# Patient Record
Sex: FEMALE | Race: WHITE
[De-identification: names, ages, dates, MRNs, and addresses within clinical notes are randomized per-mention and may not be internally consistent; named-entity substitution may affect disease eponyms.]

---

## 2020-01-31 ENCOUNTER — HOSPITAL ENCOUNTER (OUTPATIENT)
Dept: HOSPITAL 25 - OR | Age: 60
Discharge: HOME | End: 2020-01-31
Attending: INTERNAL MEDICINE
Payer: COMMERCIAL

## 2020-01-31 VITALS — SYSTOLIC BLOOD PRESSURE: 130 MMHG | DIASTOLIC BLOOD PRESSURE: 92 MMHG

## 2020-01-31 DIAGNOSIS — C77.1: Primary | ICD-10-CM

## 2020-01-31 DIAGNOSIS — C34.90: ICD-10-CM

## 2020-01-31 DIAGNOSIS — Z87.891: ICD-10-CM

## 2020-01-31 PROCEDURE — 81445 SO NEO GSAP 5-50DNA/DNA&RNA: CPT

## 2020-01-31 PROCEDURE — 88305 TISSUE EXAM BY PATHOLOGIST: CPT

## 2020-01-31 PROCEDURE — 88172 CYTP DX EVAL FNA 1ST EA SITE: CPT

## 2020-01-31 PROCEDURE — 88173 CYTOPATH EVAL FNA REPORT: CPT

## 2020-01-31 PROCEDURE — 88341 IMHCHEM/IMCYTCHM EA ADD ANTB: CPT

## 2020-01-31 PROCEDURE — 88360 TUMOR IMMUNOHISTOCHEM/MANUAL: CPT

## 2020-01-31 PROCEDURE — 88342 IMHCHEM/IMCYTCHM 1ST ANTB: CPT

## 2020-01-31 NOTE — BRIEFOPN
Brief Operative/Procedure Note





- Operation Details


Pre-Op Diagnosis: Lung mass on left side


Post-Op Diagnosis: NSCLC metastatic to L4 node


Procedures: Bronchoscopy/EBUS with FNA of R4, station 7, L4


Surgeon(s)/Proceduralists: osmany Price


Anesthesia: [place]- Dr Alberto


Estimated Blood Loss: Negligable


Findings: No malignancy in R4 and station 7. Malignant cells in L4


Specimen(s)/Culture(s) Description: Cytology from R4, station 7, L4


Complications: NOne

## 2020-02-06 LAB — GENETICIST REVIEW: (no result)

## 2020-02-20 ENCOUNTER — HOSPITAL ENCOUNTER (OUTPATIENT)
Dept: HOSPITAL 25 - OR | Age: 60
Discharge: HOME | End: 2020-02-20
Attending: SURGERY
Payer: COMMERCIAL

## 2020-02-20 VITALS — DIASTOLIC BLOOD PRESSURE: 89 MMHG | SYSTOLIC BLOOD PRESSURE: 124 MMHG

## 2020-02-20 DIAGNOSIS — C34.12: Primary | ICD-10-CM

## 2020-02-20 DIAGNOSIS — Z87.891: ICD-10-CM

## 2020-02-20 PROCEDURE — C1788 PORT, INDWELLING, IMP: HCPCS

## 2020-02-20 PROCEDURE — 76000 FLUOROSCOPY <1 HR PHYS/QHP: CPT

## 2020-02-20 PROCEDURE — 71045 X-RAY EXAM CHEST 1 VIEW: CPT

## 2020-02-20 NOTE — OP
Operative Report - Blank





- Operative Report


Date of Operation: 02/20/20


Note: 





PRE-OP DX: Lung cancer


   


POST-OP DX: Same





PROCEDURE: PowerPort placement





SURGEON: Norma Nava MD





ANESTHESIA: MAC





FINDINGS: Successful placement of 8Fr PowerPort in right subclavian vein





INDICATION:


Zuly Hicks is a 59 year-old woman who was recently diagnosed with lung cancer 

found on screening CT scan. She was seen in clinic to discuss port placement 

for treatment. Risks were discussed including but not limited to bleeding, 

infection, pneumothorax, or arrhythmia. She agreed to proceed with surgery.





DESCRIPTION:


The patient was brought to the OR and placed in the supine position on the OR 

table.  SCDs were placed.  The patient was warmed.  She received cefazolin 2g.  

MAC anesthesia was administered.  The right neck and upper chest were prepped 

and draped in the usual sterile fashion.  A timeout confirming the patient's 

name, date of birth, and procedure was called.





Lidocaine 1% was injected into the skin over the right internal jugular vein.  

The vein was cannulated with a needle under ultrasound guidance.  I attempted 

to advance the wire but met resistance. With fluoroscopy, I could see the wire 

was coiling. I removed the wire and needle and cannulated the right internal 

jugular vein again. When I advanced the wire, there again was resistance with 

the wire coiling.  I then injected lidocaine below the right clavicle before 

cannulating the right subclavian vein. The wire was easily advanced.  

Fluoroscopy confirmed correct placement of the wire in the superior vena cava.  

Next, lidocaine 1% was injected into the skin at the planned port site.  A 

transverse incision was made with a scalpel and carried down to the fascia with 

electrocautery.  A pocket under the skin was made for the port using blunt 

dissection and electrocautery.  A stab incision was made at the wire.  The 

catheter was then tunneled through the subcutaneous tissue from the pocket to 

the wire using a straight clamp.  The vein was dilated, and then the pull-away 

sheath was placed into the vein.  Fluoroscopy confirmed placement of the sheath 

in the superior vena cava.  The catheter was advanced into the sheath as the 

sheath was pulled away and out.  Under fluoroscopy, the catheter was pulled 

back until the tip was at the cavoatrial junction.  The catheter was then cut 

to the appropriate length.  The catheter was connected to the port stem, and 

the catheter lock advanced. The port was placed into the subcutaneous pocket 

and sutured to the fascia using 3-0 Prolene on each side.  A Jacobson needle was 

used to draw back blood and flush the port.  A total of 5 mL of heparin 5000 

units/5 mL was flushed through the port and catheter. 





The subcutaneous pocket was closed with interrupted 3-0 Vicryl in the deep 

dermal layer.  The skin was closed with a running 4-0 monocryl.  The stab 

incision was closed with 4-0 monocryl.  Steri-Strips were placed over both 

incisions.  The chest incision was covered with sterile gauze and Tegaderm.  





The patient tolerated the procedure well.  He was brought to recovery in stable 

condition.  A chest x-ray in recovery confirmed correct placement of the port 

and catheter in the superior vena cava without complication.

## 2020-03-12 ENCOUNTER — HOSPITAL ENCOUNTER (EMERGENCY)
Dept: HOSPITAL 25 - ED | Age: 60
Discharge: HOME | End: 2020-03-12
Payer: COMMERCIAL

## 2020-03-12 VITALS — DIASTOLIC BLOOD PRESSURE: 90 MMHG | SYSTOLIC BLOOD PRESSURE: 130 MMHG

## 2020-03-12 DIAGNOSIS — Z87.891: ICD-10-CM
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
## 2020-03-12 DIAGNOSIS — S80.862A: Primary | ICD-10-CM

## 2020-03-12 PROCEDURE — 99281 EMR DPT VST MAYX REQ PHY/QHP: CPT

## 2020-03-12 NOTE — XMS REPORT
Continuity of Care Document (CCD)

 Created on:2020



Patient:Zuly Hicks

Sex:Female

:1960

External Reference #:[id number]





Demographics







 Address  [address] [place] [address]

 

 Mobile Phone  [phone]

 

 Email Address  [email or web address]

 

 Preferred Language  en

 

 Marital Status  Not  or 

 

 Gnosticism Affiliation  Unknown

 

 Race  White

 

 Ethnic Group  Not  or 









Author







 Name  Elma Price MD (transmitted by agent of provider Josephine Solis)

 

 Address  [address], [address]



   [place] [address]









Care Team Providers







 Name  Role  Phone

 

 Maggie Zarco MD - Internal Medicine  Care Team Information   [phone]
[phone]









Problems







 Active Problems  Provider  Date

 

 Computed tomography result abnormal  Maggie Zarco M.D., FACP  Onset: 2020

 

 Solitary nodule of lung  Maggie Zarco M.D., FACP  Onset: 2020







Social History







 Type  Date  Description  Comments

 

 Birth Sex    Unknown  

 

 Cigarette Use    Quit 1 Year Ago  

 

 Cigarette Use    Pack Years -  45  

 

 ETOH Use    Occasionally consumes  



     alcohol  

 

 Tobacco Use  Start: Unknown End:  Patient is a former smoker  



   Unknown    

 

 Recreational Drug Use    Denies Drug Use  

 

 Smoking Status  Reviewed: 20  Patient is a former smoker  

 

 Exercise Type/Frequency    Exercises regularly  







Allergies, Adverse Reactions, Alerts







 Description

 

 No Known Drug Allergies







Medications







 Active Medications  SIG  Qnty  Indications  Ordering Provider  Date

 

 Ibuprofen 200  400-600mg every 6      Unknown  



            200mg  hours as needed        



 Tablets  for pain.        



           







Immunizations







 CPT Code  Status  Date  Vaccine  Reaction  Lot #

 

 [id number]  Given  2019  Influenza Virus Vaccine,  No immediate reaction  
[id number]



       Quadrivalent, Split,    



       Preservative Free    







Vital Signs







 Date  Vital  Result  Comment

 

 2020  6:42am  Height  65 inches  5'5"









 Weight  169.00 lb  

 

 Heart Rate  58 /min  

 

 BP Systolic Sitting  122 mmHg  

 

 BP Diastolic Sitting  80 mmHg  

 

 O2 % BldC Oximetry  97 %  

 

 BMI (Body Mass Index)  28.1 kg/m2  









 2020  3:47pm  Height  65 inches  5'5"









 Weight  164.44 lb  

 

 Heart Rate  77 /min  

 

 BP Systolic  114 mmHg  

 

 BP Diastolic  70 mmHg  

 

 Body Temperature  98.7 F  

 

 O2 % BldC Oximetry  98 %  

 

 BMI (Body Mass Index)  27.4 kg/m2  







Results







 Test  Acquired Date  Facility  Test  Result  H/L  Range  Note

 

 CBC Auto  2020  [facility]  White Blood  6.3 10^3/uL  Normal  
3.5-10.8  



 Diff    [address]  Count        



     [place] [address] [phone]          









 Red Blood Count  5.01 10^6/uL  High  3.70-4.87  

 

 Hemoglobin  15.3 g/dL  Normal  12.0-16.0  

 

 Hematocrit  44 %  Normal  35-47  

 

 Mean Corpuscular Volume  88 fL  Normal  80-97  

 

 Mean Corpuscular Hemoglobin  31 pg  Normal  27-31  

 

 Mean Corpuscular HGB Conc  35 g/dL  Normal  31-36  

 

 Red Cell Distribution Width  13 %  Normal  10-15  

 

 Platelet Count  217 10^3/uL  Normal  150-450  

 

 Mean Platelet Volume  9.0 fL  Normal  7.4-10.4  

 

 Abs Neutrophils  3.2 10^3/uL  Normal  1.5-7.7  

 

 Abs Lymphocytes  2.5 10^3/uL  Normal  1.0-4.8  

 

 Abs Monocytes  0.4 10^3/uL  Normal  0-0.8  

 

 Abs Eosinophils  0.1 10^3/uL  Normal  0-0.6  

 

 Abs Basophils  0.0 10^3/uL  Normal  0-0.2  

 

 Abs Nucleated RBC  0.0 10^3/uL      

 

 Granulocyte %  50.7 %      

 

 Lymphocyte %  40.0 %      

 

 Monocyte %  6.9 %      

 

 Eosinophil %  2.0 %      

 

 Basophil %  0.4 %      

 

 Nucleated Red Blood Cells %  0.0      









 Comp Metabolic  2020  [facility]  Sodium  142 mmol/L  Normal  
135-145  



 Panel    [address]          



     [place] [address] [phone]          









 Potassium  4.9 mmol/L  Normal  3.5-5.0  

 

 Chloride  105 mmol/L  Normal  101-111  

 

 Co2 Carbon Dioxide  28 mmol/L  Normal  22-32  

 

 Anion Gap  9 mmol/L  Normal  2-11  

 

 Glucose  88 mg/dL  Normal    

 

 Blood Urea Nitrogen  29 mg/dL  High  6-24  

 

 Creatinine  0.89 mg/dL  Normal  0.51-0.95  

 

 BUN/Creatinine Ratio  32.6  High  8-20  

 

 Calcium  <pending>      

 

 Total Protein  6.9 g/dL  Normal  6.4-8.9  

 

 Albumin  4.4 g/dL  Normal  3.2-5.2  

 

 Globulin  2.5 g/dL  Normal  2-4  

 

 Albumin/Globulin Ratio  1.8  Normal  1-3  

 

 Total Bilirubin  0.50 mg/dL  Normal  0.2-1.0  

 

 Alkaline Phosphatase  <pending>      

 

 Alt  29 U/L  Normal  7-52  

 

 Ast  35 U/L  Normal  13-39  

 

 Egfr Non-  64.7    >60  

 

 Egfr   78.3    >60  1









 Laboratory test  2020  [facility]  Alkaline  80 U/L  Normal  
  



 finding    [address]  Phosphatase        



     [place], [place] [id number] [phone]          









 LDH  278 U/L  High  140-271  

 

 Calcium  9.9 mg/dL  Normal  8.6-10.3  









 1  *******Because ethnic data is not always readily available,



   this report includes an eGFR for both -Americans and



   non- Americans.****



   The National Kidney Disease Education Program (NKDEP) does



   not endorse the use of the MDRD equation for patients that



   are not between the ages of 18 and 70, are pregnant, have



   extremes of body size, muscle mass, or nutritional status,



   or are non- or non-.



   According to the National Kidney Foundation, irrespective of



   diagnosis, the stage of the disease is based on the level of



   kidney function:



   Stage Description                      GFR(mL/min/1.73 m(2))



   1     Kidney damage with normal or decreased GFR       90



   2     Kidney damage with mild decrease in GFR          60-89



   3     Moderate decrease in GFR                         30-59



   4     Severe decrease in GFR                           15-29



   5     Kidney failure                       <15 (or dialysis)







Procedures







 Description

 

 No Information Available







Medical Devices







 Description

 

 No Information Available







Encounters







 Type  Date  Location  Provider  Dx  Diagnosis

 

 Office Visit  2020  [facility]  Elma Price,  R91.8  Other 
nonspecific



   7:00a  [facility]  MD    abnormal finding of



     Cma      lung field









 Z87.891  Personal history of nicotine dependence









 Office Visit  2019  9:00a  [facility]  Maggielouis Zarco,  F17.201  Nicotine



     Medicine -  MHELEN, FACP    dependence,



     Ccmob      unspecified, in



           remission









 R10.30  Lower abdominal pain, unspecified

 

 Z23  Encounter for immunization







Assessments







 Date  Code  Description  Provider

 

 2020  R91.8  Other nonspecific abnormal finding of lung  Elma Price MD



     field  

 

 2020  Z87.891  Personal history of nicotine dependence  Elma Price MD

 

 2020  R91.1  Solitary pulmonary nodule  Maggie Carolee, M.D., FACP

 

 2019  F17.201  Nicotine dependence, unspecified, in  Maggie Zarco M.D., 
FACP



     remission  

 

 2019  R10.30  Lower abdominal pain, unspecified  Maggie Zarco M.D., FACP

 

 2019  Z23  Encounter for immunization  Maggie Zarco M.D., [facility]







Plan of Treatment

Future Appointment(s):2020  9:00 am - Elma Price MD [facility]2020 - Elma Price, MDR91.8 Other nonspecific 
abnormal finding of lung fieldNew Orders:Endobronchial Ultrasound ([place]), 
Ordered: 20Follow up:2 uratoJ96.891 Personal history of nicotine 
dependenceNew Labs:Alpha 1 Antitrypsin A1a, Ordered: 20New Orders:PFTW/
Spirometry Vol Pre/Post Bronchdilat Dlco Complete, Ordered: 20



Functional Status







 Description

 

 No Information Available







Mental Status







 Description

 

 No Information Available







Referrals







 Refer to   Reason for Referral  Status  Appt Date

 

 Elma Price MD  New 2.4cm left suprahilar lung mass on  Sent  2020



   screening CT (1/10/20). Needs transbronchial    



   biopsy    









 [address]

 

 [address] [place] [id number] [phone]

## 2020-03-12 NOTE — XMS REPORT
Continuity of Care Document (CCD)

 Created on:2020



Patient:Juan Hicks

Sex:Female

:1960

External Reference #:[id number]





Demographics







 Address  [address] [place] [address]

 

 Mobile Phone  [phone]

 

 Email Address  [email or web address]

 

 Preferred Language  en

 

 Marital Status  Not  or 

 

 Mandaen Affiliation  Unknown

 

 Race  White

 

 Ethnic Group  Not  or 









Author







 Name  Corbin Pantoja MD, FACS (transmitted by agent of provider Jose Enrique Peacock)

 

 Address  [address]



   Unavailable



   [place] [address]









Care Team Providers







 Name  Role  Phone

 

 Maggie Zarco MD - Internal Medicine  Care Team Information   [phone]
[phone]

 

 David Alejandro M.D. - Hematology &  Care Team Information   [phone]
[phone]



 Oncology    









Problems







 Active Problems  Provider  Date

 

 Computed tomography result abnormal  Maggie Zarco M.D., FACP  Onset: 2020

 

 Solitary nodule of lung  Maggie Zarco M.D., FACP  Onset: 2020

 

 Malignant neoplasm of upper lobe, bronchus  Maggie Zarco M.D., FACP  Onset: 2020



 or lung    







Social History







 Type  Date  Description  Comments

 

 Birth Sex    Unknown  

 

 Cigarette Use    Quit 1 Year Ago  

 

 Cigarette Use    Pack Years -  45  

 

 ETOH Use    Occasionally consumes  3-4 per week



     alcohol  

 

 Tobacco Use  Start: Unknown End:  Patient is a former smoker  



   Unknown    

 

 Recreational Drug Use    Denies Drug Use  

 

 Smoking Status  Reviewed: 20  Patient is a former smoker  

 

 Exercise Type/Frequency    Exercises regularly  







Allergies, Adverse Reactions, Alerts







 Description

 

 No Known Drug Allergies







Medications







 Active Medications  SIG  Qnty  Indications  Ordering Provider  Date

 

 Ibuprofen 200  400-600mg every 6      Unknown  



            200mg  hours as needed        



 Tablets  for pain.        



           







Immunizations







 CPT Code  Status  Date  Vaccine  Reaction  Lot #

 

 [id number]  Given  2019  Influenza Virus Vaccine,  No immediate reaction  
[id number]



       Quadrivalent, Split,    



       Preservative Free    







Vital Signs







 Date  Vital  Result  Comment

 

 2020  9:05am  Height  65 inches  5'5"









 Weight  168.00 lb  

 

 Heart Rate  60 /min  

 

 BP Systolic Sitting  102 mmHg  

 

 BP Diastolic Sitting  68 mmHg  

 

 Respiratory Rate  16 /min  

 

 Body Temperature  97.3 F  

 

 BMI (Body Mass Index)  28.0 kg/m2  









 2020 10:27am  Height  65 inches  5'5"









 Weight  169.00 lb  

 

 Heart Rate  60 /min  

 

 BP Systolic Sitting  126 mmHg  Lue regular cuff

 

 BP Diastolic Sitting  78 mmHg  Lue regular cuff

 

 Respiratory Rate  12 /min  

 

 O2 % BldC Oximetry  97 %  

 

 BMI (Body Mass Index)  28.1 kg/m2  







Results







 Test  Acquired Date  Facility  Test  Result  H/L  Range  Note

 

 Cytology  2020  [facility]  Cytology  SEE RESULT      1



 Non-Gyn    [address]  Nongyn  BELOW      



     [place] [address] [phone]          









 PDFReport  SEE IMAGE      









 Lung Cancer  2020  [facility]  LNGPR  TNP    ()  2



 Targeted Gene    [address]  Interpretation        



 [place] [address] [phone]          

 

 Laboratory test  2020  [facility]  Point of Care  91  Normal  
  3



 finding    [address]  Glucose  mg/dL      



     [place] [address] [phone]          

 

 Xray  2020  [facility]  PET Full Body W/  <pendin      



     [address]  CT-Initial  g>      



     [place] [address] [phone]          

 

 CBC Auto Diff  2020  [facility]  White Blood Count  6.3  
Normal  3.5-10  



     [address]    10^3/uL    .8  



     [place] [address] [phone]          









 Red Blood Count  5.01 10^6/uL  High  3.70-4.87  

 

 Hemoglobin  15.3 g/dL  Normal  12.0-16.0  

 

 Hematocrit  44 %  Normal  35-47  

 

 Mean Corpuscular Volume  88 fL  Normal  80-97  

 

 Mean Corpuscular Hemoglobin  31 pg  Normal  27-31  

 

 Mean Corpuscular HGB Conc  35 g/dL  Normal  31-36  

 

 Red Cell Distribution Width  13 %  Normal  10-15  

 

 Platelet Count  217 10^3/uL  Normal  150-450  

 

 Mean Platelet Volume  9.0 fL  Normal  7.4-10.4  

 

 Abs Neutrophils  3.2 10^3/uL  Normal  1.5-7.7  

 

 Abs Lymphocytes  2.5 10^3/uL  Normal  1.0-4.8  

 

 Abs Monocytes  0.4 10^3/uL  Normal  0-0.8  

 

 Abs Eosinophils  0.1 10^3/uL  Normal  0-0.6  

 

 Abs Basophils  0.0 10^3/uL  Normal  0-0.2  

 

 Abs Nucleated RBC  0.0 10^3/uL      

 

 Granulocyte %  50.7 %      

 

 Lymphocyte %  40.0 %      

 

 Monocyte %  6.9 %      

 

 Eosinophil %  2.0 %      

 

 Basophil %  0.4 %      

 

 Nucleated Red Blood Cells %  0.0      









 Comp Metabolic  2020  [facility]  Sodium  142 mmol/L  Normal  
135-145  



 Panel    [address]          



     [place] [address] [phone]          









 Potassium  4.9 mmol/L  Normal  3.5-5.0  

 

 Chloride  105 mmol/L  Normal  101-111  

 

 Co2 Carbon Dioxide  28 mmol/L  Normal  22-32  

 

 Anion Gap  9 mmol/L  Normal  2-11  

 

 Glucose  88 mg/dL  Normal    

 

 Blood Urea Nitrogen  29 mg/dL  High  6-24  

 

 Creatinine  0.89 mg/dL  Normal  0.51-0.95  

 

 BUN/Creatinine Ratio  32.6  High  8-20  

 

 Total Protein  6.9 g/dL  Normal  6.4-8.9  

 

 Albumin  4.4 g/dL  Normal  3.2-5.2  

 

 Globulin  2.5 g/dL  Normal  2-4  

 

 Albumin/Globulin Ratio  1.8  Normal  1-3  

 

 Total Bilirubin  0.50 mg/dL  Normal  0.2-1.0  

 

 Alt  29 U/L  Normal  7-52  

 

 Ast  35 U/L  Normal  13-39  

 

 Egfr Non-  64.7    >60  

 

 Egfr   78.3    >60  4









 Laboratory test  2020  [facility]  Alkaline  80 U/L  Normal  
  



 finding    [address]  Phosphatase        



     [place] [address] [phone]          









 LDH  278 U/L  High  140-271  

 

 Calcium  9.9 mg/dL  Normal  8.6-10.3  









 1  SEE RESULT BELOW



   Name:  JUAN HICKS                  : 1960    Attend Dr: Elma Price MD



   Acct:  [id number]  Unit: [id number]  AGE: 59            Location:  OR



   Re20                        SEX: F             Status:    [facility]



   -----------------------------------------------------------------------------
---------------



   



   SPEC: [id number]             KRYSTLE:       [facility] DR: Elma Price MD



   REQ:  [id number]             RECD: 



   STATUS: SOUT



   _



   ORDERED:  FNA-IMG GUID BX/3, LEVEL 4/3, CYTO ADEQ-1ST P/3, IMMUNO-FIRST, 
IMMUNO-AD



   IMMUNO-QUANT



   



   FINAL DIAGNOSIS



   1) Lymph node, R-4, endobronchial ultrasound-guided



   fine needle aspiration:



   -- Benign lymph node. Cartilage present.



   2) Lymph node, station-7, endobronchial ultrasound-guided



   fine needle aspiration:



   -- Benign lymph node.



   3) Lymph node, L-4, endobronchial ultrasound-guided fine



   needle aspiration:



   -- Metastatic poorly differentiated



   adenocarcinoma of primary lung origin.



   -----------------------------------------------------------------------------
---------------



   COMMENT:



   A cell block was prepared in the evaluation of this specimen.



   Smears and cell block reveal similar findings.  Slides show



   clustered malignant epithelioid elements.  Immunohistochemical



   stains, with appropriately reacting controls, were performed



   with the following results:



   Napsin-A            positive



   TTF-1                positive



   



   



   



   



   ** CONTINUED ON NEXT PAGE **



   



   DEPARTMENT OF PATHOLOGY,  [address] [place] [address]



   Phone # [phone]      Fax #[phone]



   Shola Amin M.D. Director     [facility] # [id number]



   



   



   P63                negative



   CK 5/6                negative



   PD-L1                10% tumor, 0% microenvironment



   ALK                negative



   Molecular studies are pending on air dried material and the



   results will be reported in an addendum.



   



   



   



   



   



   SPECIMEN(S) RECEIVED



   #1. LYMPH NODE - US GUIDED ENDOBRONCHIAL R-4 LYMPH NODE FINE NEEDLE 
ASPIRATION,



   #2. LYMPH NODE - US GUIDED ENDOBRONCHIAL ST-7 LYMPH NODE FINE NEEDLE 
APIRATION,



   #3. LYMPH NODE - US GUIDED ENDOBRONCHIAL L-4 LYMPH NODE FINE NEEDLE 
ASPIRATION



   



   



   CLINICAL HISTORY



   #1)  R-4  endobronchial lymph node.



   #2)  St-7 endobronchial lymph node.



   #3)  L-7 endobronchial lymph node.



   



   



   IMMEDIATE INTERPRETATION



   1) Pass 1-adequate.



   2) Pass 1, and 2-adequate.



   3) Pass 1-adequate. Pass 2, and 3-additional material



   



   



   GROSS DESCRIPTION



   #1) US guided endobronchial fine needle aspiration x 1 pass(es) with 1 
alcohol fixed slides



   and needle rinse in formalin for cell block.



   #2)  US guided endobronchial fine needle aspiration x 2 pass(es) with 4 
alcohol fixed slides



   and needle rinse in formalin for cell block.



   #3)  US guided endobronchial fine needle aspiration x 3 pass(es) with 1 
lcohol fixed slides,



   4 Air dried slide(s) and needle rinse in formalin for cell block.



   



   Signed by and Reported on: __________              Mallory Espana MD 
20 1751



   



   -----------------------------------------------------------------------------
---------------



   



   ** END OF REPORT **



   



   



   DEPARTMENT OF PATHOLOGY,  [address] [place] [address]



   Phone # [phone]      Fax #[phone]



   Shola Amin M.D. Director     CLIA # [id number]

 

 2  Lung Panel with Rearrangement Tumor was cancelled on



   2020 at 18:21; There was an insufficient amount of



   tumor tissue for analysis on specimen [id number]. Please



   send additional formalin fixed material or stained cytology



   slides if testing is still desired. Refer to



   [facility] ([facility] ([place] test ID [id number]) or call



   [phone] for more information about specimen



   requirements for this test.



   Test Performed by:



   [place] [address]



   : Jovanni Diane M.D. Ph.D.; CLIA# [id number]

 

 3  : [id number]

 

 4  *******Because ethnic data is not always readily available,



   this report includes an eGFR for both -Americans and



   non- Americans.****



   The National Kidney Disease Education Program (NKDEP) does



   not endorse the use of the MDRD equation for patients that



   are not between the ages of 18 and 70, are pregnant, have



   extremes of body size, muscle mass, or nutritional status,



   or are non- or non-.



   According to the National Kidney Foundation, irrespective of



   diagnosis, the stage of the disease is based on the level of



   kidney function:



   Stage Description                      GFR(mL/min/1.73 m(2))



   1     Kidney damage with normal or decreased GFR       90



   2     Kidney damage with mild decrease in GFR          60-89



   3     Moderate decrease in GFR                         30-59



   4     Severe decrease in GFR                           15-29



   5     Kidney failure                       <15 (or dialysis)







Procedures







 Date  Code  Description  Status

 

 2020  91680  Endobronchial Ultrasound =>3  Completed







[employer] Devices







 Description

 

 No Information Available







Encounters







 Type  Date  Location  Provider  Dx  Diagnosis

 

 Office Visit  2020  [facility]  Elma Price,  C34.90  Malignant 
neoplasm



   10:45a  [facility]  MD    of unsp part of



     [facility]      unsp bronchus or



           lung

 

 Office Visit  2020  [facility]  Elma Price,  R91.8  Other 
nonspecific



   7:00a  [facility]  MD    abnormal finding



     [facility]      of lung field









 Z87.891  Personal history of nicotine dependence









 Office Visit  2020  4:00p  [facility]  Maggie Zarco,  R91.1  Solitary



     Medicine - Billie WHITLEY, FACP    pulmonary nodule

 

 Office Visit  2019  9:00a  [facility]  Maggie Zarco,  F17.201  Nicotine



     Medicine - Billie WHITLEY, FACP    dependence,



           unspecified, in



           remission









 R10.30  Lower abdominal pain, unspecified

 

 Z23  Encounter for immunization







Assessments







 Date  Code  Description  Provider

 

 2020  C34.12  Malignant neoplasm of upper lobe, left  Corbin Pantoja MD, 
FACS



     bronchus or lung  

 

 2020  C34.12  Malignant neoplasm of upper lobe, left  Maggie Zarco M.D., 
FACP



     bronchus or lung  

 

 2020  C34.90  Malignant neoplasm of unspecified part of  Elma Price MD



     unspecified bronchus or lung  

 

 2020  R91.8  Other nonspecific abnormal finding of lung  Elma Price MD



     field  

 

 2020  R91.8  Other nonspecific abnormal finding of lung  Elma Price MD



     field  

 

 2020  Z87.891  Personal history of nicotine dependence  Elma Price MD

 

 2020  R91.1  Solitary pulmonary nodule  Maggie Zarco M.D., FACP

 

 2019  F17.201  Nicotine dependence, unspecified, in  Maggie Zarco M.D., 
FACP



     remission  

 

 2019  R10.30  Lower abdominal pain, unspecified  Maggie Zarco M.D., FACP

 

 2019  Z23  Encounter for immunization  Maggie Zarco M.D., [facility]







Plan of Treatment

Future Appointment(s):2020  9:15 am - Elma Price MD at [facility]2020 - Corbin Pantoja MD, FACSC34.12 Malignant 
neoplasm of upper lobe, left bronchus or lung



Functional Status







 Description

 

 No Information Available







Mental Status







 Description

 

 No Information Available







Referrals







 Refer to Dr  Reason for Referral  Status  Appt Date

 

 David Alejandro M.D.    Created  









 [address] [place] [address] [phone]

 

 









 David Alejandro M.D.  Dr. Rai started referral.  Created  









 [address] [place] [address] [phone]

 

 









 Elma Price MD  New 2.4cm left suprahilar lung mass on screening  Sent  



   CT (1/10/20). Needs transbronchial biopsy    









 [address]

 

 [address]

 

 [place] [address] [phone]

## 2020-03-12 NOTE — ED
Skin Complaint





- HPI Summary


HPI Summary: 


Pt. is a 59 y.o female who presents to the ER for a tick to her left lower leg. 

Pt. believes she obtained tick on Monday, 3 days ago when [place] the woods. 

Pt. states she pulled it out this morning but states part of it is still in 

skin. Denies associated sxs of fever, chills, rash, joint pain. Sxs are mild in 

severity. No current modifying factors.








- History of Current Complaint


Chief Complaint: EDAnimalBite


Time Seen by Provider: 20 05:49


Stated Complaint: DEER TICK IN LT LEG PER PT


Hx Obtained From: Patient


Pain Intensity: 0





- Allergy/Home Medications


Allergies/Adverse Reactions: 


 Allergies











Allergy/AdvReac Type Severity Reaction Status Date / Time


 


No Known Allergies Allergy   Verified 20 05:34











Home Medications: 


 Home Medications





Ibuprofen TAB* [Advil TAB*] 200 mg PO ONCE PRN 20 [History Confirmed ]











PMH/Surg Hx/FS Hx/Imm Hx


Previously Healthy: Yes


Endocrine/Hematology History: 


   Denies: Hx Diabetes, Hx Anemia


Cardiovascular History: Reports: Other Cardiovascular Problems/Disorders - PAIN 

LEFT SCAPULA, RADIATING TO FRONT OF CHEST


   Denies: Hx Hypertension, Hx Pacemaker/ICD


Respiratory History: 


   Denies: Other Respiratory Problems/Disorders


GI History: Reports: Hx Gall Bladder Disease - gall bladder removed


   Denies: Other GI Disorders


 History: 


   Denies: Hx Dialysis, Hx Renal Disease, Other  Problems/Disorders


Musculoskeletal History: 


   Denies: Other Musculoskeletal History


Sensory History: Reports: Hx Contacts or Glasses - GLASSES


   Denies: Hx Hearing Aid


Opthamlomology History: Reports: Hx Contacts or Glasses - GLASSES


Neurological History: 


   Denies: Other Neuro Impairments/Disorders


Psychiatric History: 


   Denies: Hx Panic Disorder





- Cancer History


Cancer Type, Location and Year: LUNG





- Surgical History


Surgery Procedure, Year, and Place: gall bladder removed .   x2.  

partial hysterectomy


Hx Anesthesia Reactions: No


Infectious Disease History: No


Infectious Disease History: 


   Denies: Traveled Outside the [place] in Last 30 Days





- Family History


Known Family History: Positive: Other - CA, Non-Contributory





- Social History


Occupation: Employed Full-time


Lives: With Family


Alcohol Use: None


Alcohol Amount: 4-5 WINE COOLERS ONCE A WEEK


Substance Use Type: Reports: None


Hx Tobacco Use: Yes


Smoking Status (MU): Former Smoker


Amount Used/How Often: 1 PPD X 40 YEARS OFF AND ON


Have You Smoked in the Last Year: No





Review of Systems


Constitutional: Negative


Negative: Fever, Chills


Musculoskeletal: Negative


Positive: Other - Tick to posterior left calf. .  Negative: Rash


All Other Systems Reviewed And Are Negative: Yes





Physical Exam


Triage Information Reviewed: Yes


Vital Signs On Initial Exam: 


 Initial Vitals











Temp Pulse Resp BP Pulse Ox


 


 97.7 F   75   16   130/91   98 


 


 20 05:32  20 05:32  20 05:32  20 05:32  20 05:32











Vital Signs Reviewed: Yes


Appearance: Positive: Well-Appearing


Skin: Positive: Warm, Dry, Other - small localized area of erythema noted to 

left posterior calf. Small black speck present in center. No induration or 

fluctuance. No surrounding erythema.


Head/Face: Positive: Normal Head/Face Inspection


Eyes: Positive: Normal, EOMI


Neck: Positive: Supple


Neurological: Positive: CN Intact II-III


Psychiatric: Positive: Affect/Mood Appropriate





Procedures





- Sedation


Patient Received Moderate/Deep Sedation with Procedure: No





Diagnostics





- Vital Signs


 Vital Signs











  Temp Pulse Resp BP Pulse Ox


 


 20 05:32  97.7 F  75  16  130/91  98














- Laboratory


Lab Statement: Any lab studies that have been ordered have been reviewed, and 

results considered in the medical decision making process.





Course/Dx





- Course


Course Of Treatment: Remainder of tick was easily removed with tweezers. Will 

treat with a dose of prophylactic doxycyline 200mg. Will have pt. f.u with pcp 

for increased redness, swelling,pain, fever, rash, joint pain or if concerned. 

To keep area clean and dry. Pt. understands and agrees with plan.





- Differential Diagnoses - Skin Complaint


Differential Diagnoses: Tick Born Illness





- Diagnoses


Provider Diagnoses: 


 Tick bite








Discharge ED





- Sign-Out/Discharge


Documenting (check all that apply): Patient Departure





- Discharge Plan


Condition: Improved


Disposition: HOME


Patient Education Materials:  Lyme Disease (ED), Tick Bite (ED)


Referrals: 


Maggie Zarco MD [Primary Care Provider] - 


Additional Instructions: 


Follow up with PCP for rash, fever, increased redness/swelling, or pain


Keep area clean and dry


Return to ER if symptoms change or worsen





- Billing Disposition and Condition


Condition: IMPROVED


Disposition: Home

## 2020-03-12 NOTE — XMS REPORT
Continuity of Care Document (CCD)

 Created on:2020



Patient:Zuly Hicks

Sex:Female

:1960

External Reference #:[id number]





Demographics







 Address  [address] 



   [place] [address]

 

 Mobile Phone  [phone]

 

 Email Address  [email or web address]

 

 Preferred Language  en

 

 Marital Status  Not  or 

 

 Judaism Affiliation  Unknown

 

 Race  White

 

 Ethnic Group  Not  or 









Author







 Name  Maggie Zarco M.D., FACP (transmitted by agent of provider Christine Rucker)

 

 Address  [address]



   [place] [address]









Care Team Providers







 Name  Role  Phone

 

 Maggie Zarco MD - Internal Medicine  Care Team Information   [phone]
[phone]









Problems







 Active Problems  Provider  Date

 

 Computed tomography result abnormal  Maggei Zarco M.D., FACP  Onset: 2020







Social History







 Type  Date  Description  Comments

 

 Birth Sex    Unknown  

 

 Cigarette Use    Quit 1 Year Ago  

 

 Cigarette Use    Pack Years -  45  

 

 Tobacco Use  Start: Unknown End: Unknown  Patient is a former smoker  

 

 Smoking Status  Reviewed: 20  Patient is a former smoker  







Allergies, Adverse Reactions, Alerts







 Description

 

 No Known Drug Allergies







Medications







 Active Medications  SIG  Qnty  Indications  Ordering Provider  Date

 

 Ibuprofen 200  400-600mg every 6      Unknown  



            200mg  hours as needed        



 Tablets  for pain.        



           







Immunizations







 CPT Code  Status  Date  Vaccine  Reaction  Lot #

 

 [id number]  Given  2019  Influenza Virus Vaccine,  No immediate reaction  
[id number]



       Quadrivalent, Split,    



       Preservative Free    







Vital Signs







 Date  Vital  Result  Comment

 

 2020  3:47pm  Height  65 inches  5'5"









 Weight  164.44 lb  

 

 Heart Rate  77 /min  

 

 BP Systolic  114 mmHg  

 

 BP Diastolic  70 mmHg  

 

 Body Temperature  98.7 F  

 

 O2 % BldC Oximetry  98 %  

 

 BMI (Body Mass Index)  27.4 kg/m2  









 2019  9:01am  Height  65 inches  5'5"









 Weight  170.00 lb  

 

 Heart Rate  76 /min  

 

 BP Systolic Sitting  124 mmHg  Rue reg cuff

 

 BP Diastolic Sitting  80 mmHg  Rue reg cuff

 

 O2 % BldC Oximetry  97 %  

 

 BMI (Body Mass Index)  28.3 kg/m2  







Results







 Description

 

 No Information Available







Procedures







 Description

 

 No Information Available







Medical Devices







 Description

 

 No Information Available







Encounters







 Type  Date  Location  Provider  Dx  Diagnosis

 

 Office Visit  2019  [facility]  Maggie Zarco,  F17.201  Nicotine



   9:00a  Medicine - [place]  M.D., FACP    dependence,



           unspecified, in



           remission









 R10.30  Lower abdominal pain, unspecified

 

 Z23  Encounter for immunization







Assessments







 Date  Code  Description  Provider

 

 2020  R91.1  Solitary pulmonary nodule  Maggie Zarco M.D., FACP

 

 2019  F17.201  Nicotine dependence, unspecified, in  Maggie Zarco M.D., 
FACP



     remission  

 

 2019  R10.30  Lower abdominal pain, unspecified  Maggie Zarco M.D., FACP

 

 2019  Z23  Encounter for immunization  Maggie Zarco M.D., [facility]







Plan of Treatment

2020 - Maggie Zarco M.D., FACPR91.1 Solitary pulmonary noduleNew Xrays:
PET Full Body W/ CT-Initial, Ordered: 20Comments:SOLITARY PULMONARY NODULE
:The screening CT picked up a nodule that is suspicious.Today we talked about 
the next steps to work this up.  I would like to get some blood work today.  We 
will schedule a referral to the pulmonologist who can biopsy the nodule.  You 
will also need to have additional imagingstudiesReferral:Elma Price MD, 
Pulmonary Diseases



Functional Status







 Description

 

 No Information Available







Mental Status







 Description

 

 No Information Available







Referrals







 Refer to   Reason for Referral  Status  Appt Date

 

 Elma Price MD  New 2.4cm left suprahilar lung mass on  Sent  



   screening CT (1/10/20). Needs transbronchial    



   biopsy    









 [address]

 

 [address]

 

 [place] [address] [phone]

## 2020-03-12 NOTE — XMS REPORT
Continuity of Care Document (CCD)

 Created on:2020



Patient:Juan Hicks

Sex:Female

:1960

External Reference #:[id number]





Demographics







 Address  [address] 



   [place] [address]

 

 Mobile Phone  [phone]

 

 Email Address  [email or web address]

 

 Preferred Language  en

 

 Marital Status  Not  or 

 

 [employer] Affiliation  Unknown

 

 Race  White

 

 Ethnic Group  Not  or 









Author







 Name  Elma Price MD (transmitted by agent of provider Mely Guzman)

 

 Address  [address], [address]



   [place] [address]









Care Team Providers







 Name  Role  Phone

 

 Maggie Zarco MD - Internal Medicine  Care Team Information   [phone]
[phone]









Problems







 Active Problems  Provider  Date

 

 Computed tomography result abnormal  Maggie Zarco M.D., FACP  Onset: 2020

 

 Solitary nodule of lung  Maggie Zarco M.D., FACP  Onset: 2020







Social History







 Type  Date  Description  Comments

 

 Birth Sex    Unknown  

 

 Cigarette Use    Quit 1 Year Ago  

 

 Cigarette Use    Pack Years -  45  

 

 ETOH Use    Occasionally consumes  



     alcohol  

 

 Tobacco Use  Start: Unknown End:  Patient is a former smoker  



   Unknown    

 

 Recreational Drug Use    Denies Drug Use  

 

 Smoking Status  Reviewed: 20  Patient is a former smoker  

 

 Exercise Type/Frequency    Exercises regularly  







Allergies, Adverse Reactions, Alerts







 Description

 

 No Known Drug Allergies







Medications







 Active Medications  SIG  Qnty  Indications  Ordering Provider  Date

 

 Ibuprofen 200  400-600mg every 6      Unknown  



            200mg  hours as needed        



 Tablets  for pain.        



           







Immunizations







 CPT Code  Status  Date  Vaccine  Reaction  Lot #

 

 [id number]  Given  2019  Influenza Virus Vaccine,  No immediate reaction  
[id number]



       Quadrivalent, Split,    



       Preservative Free    







Vital Signs







 Date  Vital  Result  Comment

 

 2020 10:27am  Height  65 inches  5'5"









 Weight  169.00 lb  

 

 Heart Rate  60 /min  

 

 BP Systolic Sitting  126 mmHg  Lue regular cuff

 

 BP Diastolic Sitting  78 mmHg  Lue regular cuff

 

 Respiratory Rate  12 /min  

 

 O2 % BldC Oximetry  97 %  

 

 BMI (Body Mass Index)  28.1 kg/m2  









 2020  6:42am  Height  65 inches  5'5"









 Weight  169.00 lb  

 

 Heart Rate  58 /min  

 

 BP Systolic Sitting  122 mmHg  

 

 BP Diastolic Sitting  80 mmHg  

 

 O2 % [facility] Oximetry  97 %  

 

 BMI (Body Mass Index)  28.1 kg/m2  







Results







 Test  Acquired Date  Facility  Test  Result  H/L  Range  Note

 

 Cytology  2020  [facility]  Cytology  SEE RESULT      1



 Non-Gyn    [address]      



     [place] [address]          



     [phone]          









 PDFReport  SEE IMAGE      









 Laboratory test  2020  [facility]  Point of  91 mg/dL  Normal
    2



 finding    [address]  Care Glucose        



     [place] [address]          



     [phone]          

 

 Xray  2020  [facility]  PET Full  <pending>      



     [address]  Body W/        



     [place] [address]  CT-Initial        



     [phone]          

 

 CBC Auto Diff  2020  [facility]  White Blood  6.3  Normal  3.5
-10.8  



     [address]  Count  10^3/uL      



     [place] [address]          



     [phone]          









 Red Blood Count  5.01 10^6/uL  High  3.70-4.87  

 

 Hemoglobin  15.3 g/dL  Normal  12.0-16.0  

 

 Hematocrit  44 %  Normal  35-47  

 

 Mean Corpuscular Volume  88 fL  Normal  80-97  

 

 Mean Corpuscular Hemoglobin  31 pg  Normal  27-31  

 

 Mean Corpuscular HGB Conc  35 g/dL  Normal  31-36  

 

 Red Cell Distribution Width  13 %  Normal  10-15  

 

 Platelet Count  217 10^3/uL  Normal  150-450  

 

 Mean Platelet Volume  9.0 fL  Normal  7.4-10.4  

 

 Abs Neutrophils  3.2 10^3/uL  Normal  1.5-7.7  

 

 Abs Lymphocytes  2.5 10^3/uL  Normal  1.0-4.8  

 

 Abs Monocytes  0.4 10^3/uL  Normal  0-0.8  

 

 Abs Eosinophils  0.1 10^3/uL  Normal  0-0.6  

 

 Abs Basophils  0.0 10^3/uL  Normal  0-0.2  

 

 Abs Nucleated RBC  0.0 10^3/uL      

 

 Granulocyte %  50.7 %      

 

 Lymphocyte %  40.0 %      

 

 Monocyte %  6.9 %      

 

 Eosinophil %  2.0 %      

 

 Basophil %  0.4 %      

 

 Nucleated Red Blood Cells %  0.0      









 Comp Metabolic  2020  [facility]  Sodium  142 mmol/L  Normal  
135-145  



 Panel    [address]          



     [place] [address] [phone]          









 Potassium  4.9 mmol/L  Normal  3.5-5.0  

 

 Chloride  105 mmol/L  Normal  101-111  

 

 Co2 Carbon Dioxide  28 mmol/L  Normal  22-32  

 

 Anion Gap  9 mmol/L  Normal  2-11  

 

 Glucose  88 mg/dL  Normal    

 

 Blood Urea Nitrogen  29 mg/dL  High  6-24  

 

 Creatinine  0.89 mg/dL  Normal  0.51-0.95  

 

 BUN/Creatinine Ratio  32.6  High  8-20  

 

 Total Protein  6.9 g/dL  Normal  6.4-8.9  

 

 Albumin  4.4 g/dL  Normal  3.2-5.2  

 

 Globulin  2.5 g/dL  Normal  2-4  

 

 Albumin/Globulin Ratio  1.8  Normal  1-3  

 

 Total Bilirubin  0.50 mg/dL  Normal  0.2-1.0  

 

 Alt  29 U/L  Normal  7-52  

 

 Ast  35 U/L  Normal  13-39  

 

 Egfr Non-  64.7    >60  

 

 Egfr   78.3    >60  3









 Laboratory test  2020  [facility]  Alkaline  80 U/L  Normal  
  



 finding    [address]  Phosphatase        



     [place] [address] [phone]          









 LDH  278 U/L  High  140-271  

 

 Calcium  9.9 mg/dL  Normal  8.6-10.3  









 1  SEE RESULT BELOW



   Name:  JUAN HICKS AMANDA                  : 1960    Attend Dr: Elma Price MD



   Acct:  [id number]  Unit: [id number]  AGE: 59            Location:  OR



   Re20                        SEX: F             Status:    [place] [facility]



   -----------------------------------------------------------------------------
---------------



   



   SPEC: [id number]             KRYSTLE:       SUBM DR: Elma Price MD



   REQ:  [id number]             RECD: 



   STATUS: SOUT



   _



   ORDERED:  FNA-IMG GUID BX/3, LEVEL 4/3, CYTO ADEQ-1ST P/3, IMMUNO-FIRST, 
IMMUNO-AD



   IMMUNO-QUANT



   



   FINAL DIAGNOSIS



   1) Lymph node, R-4, endobronchial ultrasound-guided



   fine needle aspiration:



   -- Benign lymph node. Cartilage present.



   2) Lymph node, station-7, endobronchial ultrasound-guided



   fine needle aspiration:



   -- Benign lymph node.



   3) Lymph node, L-4, endobronchial ultrasound-guided fine



   needle aspiration:



   -- Metastatic poorly differentiated



   adenocarcinoma of primary lung origin.



   -----------------------------------------------------------------------------
---------------



   COMMENT:



   A cell block was prepared in the evaluation of this specimen.



   Smears and cell block reveal similar findings.  Slides show



   clustered malignant epithelioid elements.  Immunohistochemical



   stains, with appropriately reacting controls, were performed



   with the following results:



   Napsin-A            positive



   TTF-1                positive



   



   



   



   



   ** CONTINUED ON NEXT PAGE **



   



   DEPARTMENT OF PATHOLOGY,  [address] [place] [address]



   Phone # [phone]      Fax #[phone]



   Shola Amin M.D. Director     [facility] # [id number]



   



   



   P63                negative



   CK 5/6                negative



   PD-L1                10% tumor, 0% microenvironment



   ALK                negative



   Molecular studies are pending on air dried material and the



   results will be reported in an addendum.



   



   



   



   



   



   SPECIMEN(S) RECEIVED



   #1. LYMPH NODE - US GUIDED ENDOBRONCHIAL R-4 LYMPH NODE FINE NEEDLE 
ASPIRATION,



   #2. LYMPH NODE - US GUIDED ENDOBRONCHIAL ST-7 LYMPH NODE FINE NEEDLE 
APIRATION,



   #3. LYMPH NODE - US GUIDED ENDOBRONCHIAL L-4 LYMPH NODE FINE NEEDLE 
ASPIRATION



   



   



   CLINICAL HISTORY



   #1)  R-4  endobronchial lymph node.



   #2)  St-7 endobronchial lymph node.



   #3)  L-7 endobronchial lymph node.



   



   



   IMMEDIATE INTERPRETATION



   1) Pass 1-adequate.



   2) Pass 1, and 2-adequate.



   3) Pass 1-adequate. Pass 2, and 3-additional material



   



   



   GROSS DESCRIPTION



   #1) US guided endobronchial fine needle aspiration x 1 pass(es) with 1 
alcohol fixed slides



   and needle rinse in formalin for cell block.



   #2)  US guided endobronchial fine needle aspiration x 2 pass(es) with 4 
alcohol fixed slides



   and needle rinse in formalin for cell block.



   #3)  US guided endobronchial fine needle aspiration x 3 pass(es) with 1 
lcohol fixed slides,



   4 Air dried slide(s) and needle rinse in formalin for cell block.



   



   Signed by and Reported on: __________              Mallory Espana MD 
20 1751



   



   -----------------------------------------------------------------------------
---------------



   



   ** END OF REPORT **



   



   



   DEPARTMENT OF PATHOLOGY,  [address] [place] [address]



   Phone # [phone]      Fax #[phone]



   Shola Amin M.D. Director     [facility] # [id number]

 

 2  : [id number]

 

 3  *******Because ethnic data is not always readily available,



   this report includes an eGFR for both -Americans and



   non- Americans.****



   The National Kidney Disease Education Program (NKDEP) does



   not endorse the use of the MDRD equation for patients that



   are not between the ages of 18 and 70, are pregnant, have



   extremes of body size, muscle mass, or nutritional status,



   or are non- or non-.



   According to the National Kidney Foundation, irrespective of



   diagnosis, the stage of the disease is based on the level of



   kidney function:



   Stage Description                      GFR(mL/min/1.73 m(2))



   1     Kidney damage with normal or decreased GFR       90



   2     Kidney damage with mild decrease in GFR          60-89



   3     Moderate decrease in GFR                         30-59



   4     Severe decrease in GFR                           15-29



   5     Kidney failure                       <15 (or dialysis)







Procedures







 Date  Code  Description  Status

 

 2020  16785  Endobronchial Ultrasound =>3  Completed







[employer]







 Description

 

 No Information Available







Encounters







 Type  Date  Location  Provider  Dx  Diagnosis

 

 Office Visit  2020  [facility]  Elma Price,  C34.90  Malignant 
neoplasm



   10:45a  [facility]  MD    of unsp part of



     [facility]      unsp bronchus or



           lung

 

 Office Visit  2020  [facility]  Elma Price,  R91.8  Other 
nonspecific



   7:00a  [facility]  MD    abnormal finding



     [facility]      of lung field









 Z87.891  Personal history of nicotine dependence









 Office Visit  2020  4:00p  [facility]  Maggie Zarco,  R91.1  Solitary



     Medicine - Billie WHITLEY, FACP    pulmonary nodule

 

 Office Visit  2019  9:00a  [facility]  Maggie Zarco,  F17.201  Nicotine



     Medicine - Billie WHITLEY, FACP    dependence,



           unspecified, in



           remission









 R10.30  Lower abdominal pain, unspecified

 

 Z23  Encounter for immunization







Assessments







 Date  Code  Description  Provider

 

 2020  C34.90  Malignant neoplasm of unspecified part of  Elma Price MD



     unspecified bronchus or lung  

 

 2020  R91.8  Other nonspecific abnormal finding of lung  Elma Price MD



     field  

 

 2020  R91.8  Other nonspecific abnormal finding of lung  Elma Price MD



     field  

 

 2020  Z87.891  Personal history of nicotine dependence  Elma Price MD

 

 2020  R91.1  Solitary pulmonary nodule  Maggie Zarco M.D., FACP

 

 2019  F17.201  Nicotine dependence, unspecified, in  Maggie Zarco M.D., 
FACP



     remission  

 

 2019  R10.30  Lower abdominal pain, unspecified  Maggie Zarco M.D., FACP

 

 2019  Z23  Encounter for immunization  Maggie Zarco M.D., [facility]







Plan of Treatment

Future Appointment(s):2020  9:15 am - Elma Price MD at [facility]2020 - Elma Price MDC34.90 Malignant 
neoplasm of unspecified part of unspecified bronchus or lungReferral:David Alejandro M.D., Hematology &amp; OncologyFollow up:3 months



Functional Status







 Description

 

 No Information Available







Mental Status







 Description

 

 No Information Available







Referrals







 Refer to Dr  Reason for Referral  Status  Appt Date

 

 David Alejandro M.D.    Created  









 [address] [id number] [phone]

 

 









 Elma Price MD  New 2.4cm left suprahilar lung mass on screening  Sent  



   CT (1/10/20). Needs transbronchial biopsy    









 [address]

 

 [address]

 

 [place] [address] [phone]

## 2020-03-12 NOTE — XMS REPORT
Continuity of Care Document (CCD)

 Created on:2020



Patient:Juan Hicks

Sex:Female

:1960

External Reference #:[id number]





Demographics







 Address  [address] 



   [place] [address]

 

 Mobile Phone  [phone]

 

 Email Address  [email or web address]

 

 Preferred Language  en

 

 Marital Status  Not  or 

 

 Synagogue Affiliation  Unknown

 

 Race  White

 

 Ethnic Group  Not  or 









Author







 Name  Maggie Zarco M.D., FACP (transmitted by agent of provider Verena Mohamud
)

 

 Address  [address], [address]



   [place] [address]









Care Team Providers







 Name  Role  Phone

 

 Maggie Zarco MD - Internal Medicine  Care Team Information   [phone]
[phone]









Problems







 Active Problems  Provider  Date

 

 Computed tomography result abnormal  Maggie Zarco M.D., FACP  Onset: 2020

 

 Solitary nodule of lung  Maggie Zarco M.D., FACP  Onset: 2020

 

 Malignant neoplasm of upper lobe, bronchus  Maggie Zarco M.D., FACP  Onset: 2020



 or lung    







Social History







 Type  Date  Description  Comments

 

 Birth Sex    Unknown  

 

 Cigarette Use    Quit 1 Year Ago  

 

 Cigarette Use    Pack Years -  45  

 

 ETOH Use    Occasionally consumes  



     alcohol  

 

 Tobacco Use  Start: Unknown End:  Patient is a former smoker  



   Unknown    

 

 Recreational Drug Use    Denies Drug Use  

 

 Smoking Status  Reviewed: 20  Patient is a former smoker  

 

 Exercise Type/Frequency    Exercises regularly  







Allergies, Adverse Reactions, Alerts







 Description

 

 No Known Drug Allergies







Medications







 Active Medications  SIG  Qnty  Indications  Ordering Provider  Date

 

 Ibuprofen 200  400-600mg every 6      Unknown  



            200mg  hours as needed        



 Tablets  for pain.        



           







Immunizations







 CPT Code  Status  Date  Vaccine  Reaction  Lot #

 

 [id number]  Given  2019  Influenza Virus Vaccine,  No immediate reaction  
[id number]



       Quadrivalent, Split,    



       Preservative Free    







Vital Signs







 Date  Vital  Result  Comment

 

 2020 10:27am  Height  65 inches  5'5"









 Weight  169.00 lb  

 

 Heart Rate  60 /min  

 

 BP Systolic Sitting  126 mmHg  Lue regular cuff

 

 BP Diastolic Sitting  78 mmHg  Lue regular cuff

 

 Respiratory Rate  12 /min  

 

 O2 % BldC Oximetry  97 %  

 

 BMI (Body Mass Index)  28.1 kg/m2  









 2020  6:42am  Height  65 inches  5'5"









 Weight  169.00 lb  

 

 Heart Rate  58 /min  

 

 BP Systolic Sitting  122 mmHg  

 

 BP Diastolic Sitting  80 mmHg  

 

 O2 % BldC Oximetry  97 %  

 

 BMI (Body Mass Index)  28.1 kg/m2  







Results







 Test  Acquired Date  Facility  Test  Result  H/L  Range  Note

 

 Cytology  2020  [facility]  Cytology  SEE RESULT      1



 Non-Gyn    [address]  Nongyn  BELOW      



     [place], [place] [address] [phone]          









 PDFReport  SEE IMAGE      









 Laboratory test  2020  [facility]  Point of  91 mg/dL  Normal
    2



 finding    [address]  Care Glucose        



     [place] [address] [phone]          

 

 Xray  2020  [facility]  PET Full  <pending>      



     [address]  Body W/        



     [place] [address]  CT-Initial        



     [phone]          

 

 CBC Auto Diff  2020  [facility]  White Blood  6.3  Normal  3.5
-10.8  



     [address]  Count  10^3/uL      



     [place], [place] [address] [phone]          









 Red Blood Count  5.01 10^6/uL  High  3.70-4.87  

 

 Hemoglobin  15.3 g/dL  Normal  12.0-16.0  

 

 Hematocrit  44 %  Normal  35-47  

 

 Mean Corpuscular Volume  88 fL  Normal  80-97  

 

 Mean Corpuscular Hemoglobin  31 pg  Normal  27-31  

 

 Mean Corpuscular HGB Conc  35 g/dL  Normal  31-36  

 

 Red Cell Distribution Width  13 %  Normal  10-15  

 

 Platelet Count  217 10^3/uL  Normal  150-450  

 

 Mean Platelet Volume  9.0 fL  Normal  7.4-10.4  

 

 Abs Neutrophils  3.2 10^3/uL  Normal  1.5-7.7  

 

 Abs Lymphocytes  2.5 10^3/uL  Normal  1.0-4.8  

 

 Abs Monocytes  0.4 10^3/uL  Normal  0-0.8  

 

 Abs Eosinophils  0.1 10^3/uL  Normal  0-0.6  

 

 Abs Basophils  0.0 10^3/uL  Normal  0-0.2  

 

 Abs Nucleated RBC  0.0 10^3/uL      

 

 Granulocyte %  50.7 %      

 

 Lymphocyte %  40.0 %      

 

 Monocyte %  6.9 %      

 

 Eosinophil %  2.0 %      

 

 Basophil %  0.4 %      

 

 Nucleated Red Blood Cells %  0.0      









 Comp Metabolic  2020  [facility]  Sodium  142 mmol/L  Normal  
135-145  



 Panel    [address]          



     [place] [address] [phone]          









 Potassium  4.9 mmol/L  Normal  3.5-5.0  

 

 Chloride  105 mmol/L  Normal  101-111  

 

 Co2 Carbon Dioxide  28 mmol/L  Normal  22-32  

 

 Anion Gap  9 mmol/L  Normal  2-11  

 

 Glucose  88 mg/dL  Normal    

 

 Blood Urea Nitrogen  29 mg/dL  High  6-24  

 

 Creatinine  0.89 mg/dL  Normal  0.51-0.95  

 

 BUN/Creatinine Ratio  32.6  High  8-20  

 

 Total Protein  6.9 g/dL  Normal  6.4-8.9  

 

 Albumin  4.4 g/dL  Normal  3.2-5.2  

 

 Globulin  2.5 g/dL  Normal  2-4  

 

 Albumin/Globulin Ratio  1.8  Normal  1-3  

 

 Total Bilirubin  0.50 mg/dL  Normal  0.2-1.0  

 

 Alt  29 U/L  Normal  7-52  

 

 Ast  35 U/L  Normal  13-39  

 

 Egfr Non-  64.7    >60  

 

 Egfr   78.3    >60  3









 Laboratory test  2020  [facility]  Alkaline  80 U/L  Normal  
  



 finding    [address]  Phosphatase        



     [place] [address] [phone]          









 LDH  278 U/L  High  140-271  

 

 Calcium  9.9 mg/dL  Normal  8.6-10.3  









 1  SEE RESULT BELOW



   Name:  JUAN HICKS                  : 1960    Attend Dr: Elma Price MD



   Acct:  [id number]  Unit: [id number]  AGE: 59            Location:  OR



   Re20                        SEX: F             Status:    ANAIS KRUEGER



   -----------------------------------------------------------------------------
---------------



   



   SPEC: [id number]             KRYSTLE:       SUBM DR: Elma Price MD



   REQ:  [id number]             RECD: 01/



   STATUS: SOUT



   _



   ORDERED:  FNA-IMG GUID BX/3, LEVEL 4/3, CYTO ADEQ-1ST P/3, IMMUNO-FIRST, 
IMMUNO-AD



   IMMUNO-QUANT



   



   FINAL DIAGNOSIS



   1) Lymph node, R-4, endobronchial ultrasound-guided



   fine needle aspiration:



   -- Benign lymph node. Cartilage present.



   2) Lymph node, station-7, endobronchial ultrasound-guided



   fine needle aspiration:



   -- Benign lymph node.



   3) Lymph node, L-4, endobronchial ultrasound-guided fine



   needle aspiration:



   -- Metastatic poorly differentiated



   adenocarcinoma of primary lung origin.



   -----------------------------------------------------------------------------
---------------



   COMMENT:



   A cell block was prepared in the evaluation of this specimen.



   Smears and cell block reveal similar findings.  Slides show



   clustered malignant epithelioid elements.  Immunohistochemical



   stains, with appropriately reacting controls, were performed



   with the following results:



   Napsin-A            positive



   TTF-1                positive



   



   



   



   



   ** CONTINUED ON NEXT PAGE **



   



   DEPARTMENT OF PATHOLOGY,  [address] [place] [address]



   Phone # [phone]      Fax #[phone]



   Shola Amin M.D. Director     [facility] # [id number]



   



   



   P63                negative



   CK 5/6                negative



   PD-L1                10% tumor, 0% microenvironment



   ALK                negative



   Molecular studies are pending on air dried material and the



   results will be reported in an addendum.



   



   



   



   



   



   SPECIMEN(S) RECEIVED



   #1. LYMPH NODE - US GUIDED ENDOBRONCHIAL R-4 LYMPH NODE FINE NEEDLE 
ASPIRATION,



   #2. LYMPH NODE - US GUIDED ENDOBRONCHIAL ST-7 LYMPH NODE FINE NEEDLE 
APIRATION,



   #3. LYMPH NODE - US GUIDED ENDOBRONCHIAL L-4 LYMPH NODE FINE NEEDLE 
ASPIRATION



   



   



   CLINICAL HISTORY



   #1)  R-4  endobronchial lymph node.



   #2)  St-7 endobronchial lymph node.



   #3)  L-7 endobronchial lymph node.



   



   



   IMMEDIATE INTERPRETATION



   1) Pass 1-adequate.



   2) Pass 1, and 2-adequate.



   3) Pass 1-adequate. Pass 2, and 3-additional material



   



   



   GROSS DESCRIPTION



   #1) US guided endobronchial fine needle aspiration x 1 pass(es) with 1 
alcohol fixed slides



   and needle rinse in formalin for cell block.



   #2)  US guided endobronchial fine needle aspiration x 2 pass(es) with 4 
alcohol fixed slides



   and needle rinse in formalin for cell block.



   #3)  US guided endobronchial fine needle aspiration x 3 pass(es) with 1 
lcohol fixed slides,



   4 Air dried slide(s) and needle rinse in formalin for cell block.



   



   Signed by and Reported on: __________              Mallory Espana MD 
20 1751



   



   -----------------------------------------------------------------------------
---------------



   



   ** END OF REPORT **



   



   



   DEPARTMENT OF PATHOLOGY,  [address] [place] [address]



   Phone # [phone]      Fax #[phone]



   Shola Amin M.D. Director     [facility] # [id number]

 

 2  : [id number]

 

 3  *******Because ethnic data is not always readily available,



   this report includes an eGFR for both -Americans and



   non- Americans.****



   The National Kidney Disease Education Program (NKDEP) does



   not endorse the use of the MDRD equation for patients that



   are not between the ages of 18 and 70, are pregnant, have



   extremes of body size, muscle mass, or nutritional status,



   or are non- or non-.



   According to the National Kidney Foundation, irrespective of



   diagnosis, the stage of the disease is based on the level of



   kidney function:



   Stage Description                      GFR(mL/min/1.73 m(2))



   1     Kidney damage with normal or decreased GFR       90



   2     Kidney damage with mild decrease in GFR          60-89



   3     Moderate decrease in GFR                         30-59



   4     Severe decrease in GFR                           15-29



   5     Kidney failure                       <15 (or dialysis)







Procedures







 Date  Code  Description  Status

 

 2020  [address]  Endobronchial Ultrasound =>3  Completed







[employer]







 Description

 

 No Information Available







Encounters







 Type  Date  Location  Provider  Dx  Diagnosis

 

 Office Visit  2020  [facility]  Elma Price,  R91.8  Other 
nonspecific



   7:00a  [facility]  MD    abnormal finding of



     [facility]      lung field









 Z87.891  Personal history of nicotine dependence









 Office Visit  2020  4:00p  [facility] Internal  Maggie Zarco,  R91.1  Solitary



     Medicine - Salob  M.D., FACP    pulmonary nodule

 

 Office Visit  2019  9:00a  [facility] Internal  Maggie Zarco,  F17.201  Nicotine



     Medicine - Billie WHITLEY, FACP    dependence,



           unspecified, in



           remission









 R10.30  Lower abdominal pain, unspecified

 

 Z23  Encounter for immunization







Assessments







 Date  Code  Description  Provider

 

 2020  C34.12  Malignant neoplasm of upper lobe, left  Maggie Zarco M.D., 
FACP



     bronchus or lung  

 

 2020  C34.90  Malignant neoplasm of unspecified part of  Elma Price MD



     unspecified bronchus or lung  

 

 2020  R91.8  Other nonspecific abnormal finding of lung  Elma Price MD



     field  

 

 2020  R91.8  Other nonspecific abnormal finding of lung  Elma Price MD



     field  

 

 2020  Z87.891  Personal history of nicotine dependence  Elma Price MD

 

 2020  R91.1  Solitary pulmonary nodule  Maggie Zarco M.D., FACP

 

 2019  F17.201  Nicotine dependence, unspecified, in  Maggie Zarco M.D., 
FACP



     remission  

 

 2019  R10.30  Lower abdominal pain, unspecified  Maggie Zarco M.D., FACP

 

 2019  Z23  Encounter for immunization  Maggie Zarco M.D., FACP







Plan of Treatment

Future Appointment(s):2020  9:15 am - Elma Price MD at [facility]2020 - Maggie Zarco M.D., FACPC34.12 Malignant 
neoplasm of upper lobe, left bronchus or lungComments:LUNG CANCER:I understand 
that you have lung function tests scheduled in the near future.We will generate 
another refferal to Dr. Alejandro.Your paperwork will ready by tomorrow.Referral:
David Alejandro M.D., Hematology &amp; Oncology



Functional Status







 Description

 

 No Information Available







Mental Status







 Description

 

 No Information Available







Referrals







 Refer to Dr  Reason for Referral  Status  Appt Date

 

 David Alejandro M.D.    Created  









 [address] [place] [address] [phone]

 

 









 David Alejandro M.D.  Dr. Rai started referral.  Created  









 [address] [place] [address] [phone]

 

 









 Elma Price MD  New 2.4cm left suprahilar lung mass on screening  Sent  



   CT (1/10/20). Needs transbronchial biopsy    









 [address]

 

 [address]

 

 [place] [address] [phone]

## 2024-05-26 NOTE — PRO
BRONCHOSCOPY REPORT:

 

DATE OF PROCEDURE:  01/31/20

 

PROCEDURE PERFORMED:  Bronchoscopy with endobronchial ultrasound-guided fine 
needle aspiration of mediastinal nodes.

 

ANESTHESIA:  General anesthesia.

 

ANESTHESIOLOGIST:  Dr. Alberto.

 

DESCRIPTION OF PROCEDURE:  Informed consent was obtained from the patient prior 
to the procedure after all the risks and benefits were thoroughly explained.  
The patient recently noted to have abnormal CT chest.  The patient was 
intubated with size 8.5 endotracheal tube.  Flexible Olympus bronchoscope was 
inserted through ET tube for airway inspection.  No endobronchial lesions were 
noted.  Thick secretions were noted and were suctioned out.  Bronchoscope was 
then withdrawn and EBUS bronchoscope was inserted.  Station R4 was minimally 
enlarged and was sampled with 1 pass.  Rapid on-site evaluation revealed 
lymphatic tissue.  Station 7 then was accessed with 2 passes.  Rapid on-site 
evaluation revealed lymphatic tissue with no malignant cells.  L4 was then 
accessed with 4 passes.  Rapid on-site evaluation revealed malignant cells.  
Rest of the specimen was placed in formalin. Bronchoscope was then withdrawn 
and Olympus bronchoscope was reinserted.  Minimal bleeding was noted and was 
suctioned out.  The patient tolerated the procedure well.  The patient was 
extubated and seen in Recovery in optimal condition.

 

 [id number]/[facility] #: [id number]

[facility]
36.4